# Patient Record
Sex: MALE | Race: WHITE
[De-identification: names, ages, dates, MRNs, and addresses within clinical notes are randomized per-mention and may not be internally consistent; named-entity substitution may affect disease eponyms.]

---

## 2021-04-19 ENCOUNTER — HOSPITAL ENCOUNTER (EMERGENCY)
Dept: HOSPITAL 56 - MW.ED | Age: 56
Discharge: HOME | End: 2021-04-19
Payer: SELF-PAY

## 2021-04-19 DIAGNOSIS — L03.221: Primary | ICD-10-CM

## 2021-04-19 LAB
BUN SERPL-MCNC: 19 MG/DL (ref 7–18)
CHLORIDE SERPL-SCNC: 101 MMOL/L (ref 98–107)
CO2 SERPL-SCNC: 26.8 MMOL/L (ref 21–32)
GLUCOSE SERPL-MCNC: 97 MG/DL (ref 74–106)
POTASSIUM SERPL-SCNC: 4.3 MMOL/L (ref 3.5–5.1)
SODIUM SERPL-SCNC: 139 MMOL/L (ref 136–148)

## 2021-04-19 PROCEDURE — 80053 COMPREHEN METABOLIC PANEL: CPT

## 2021-04-19 PROCEDURE — 85025 COMPLETE CBC W/AUTO DIFF WBC: CPT

## 2021-04-19 PROCEDURE — 70491 CT SOFT TISSUE NECK W/DYE: CPT

## 2021-04-19 PROCEDURE — 36415 COLL VENOUS BLD VENIPUNCTURE: CPT

## 2021-04-19 PROCEDURE — 99284 EMERGENCY DEPT VISIT MOD MDM: CPT

## 2021-04-19 NOTE — CT
INDICATION:



Pain and swelling within the posterior neck.



COMPARISON:



none



TECHNIQUE:



A CT volumetric acquisition was performed of the neck during intravenous 

infusion of 80 cc of Isovue 370. 



FINDINGS:



The CT images demonstrate subcutaneous edema within the right upper neck 

and posterior skull region. There is also mild swelling within the splenius 

capitis muscle which contains a small 12 x 5 mm focus of decreased density 

and marginal enhancement. This could reflect a small intramuscular abscess 

and is identified on images [id number]. There are few small posterior cervical 

lymph nodes. 



Normal aeration of the mastoid air cells and middle ear cavities. The 

paranasal sinuses are clear. The nasopharynx appears normal. The parotid 

and submandibular glands are of normal size and have uniform enhancement. 

The oropharynx appears normal. The valleculae, epiglottis, aryepiglottic 

folds and piriform sinuses appear normal. There is a normal appearance of 

the larynx and subglottic trachea. The thyroid gland is of normal size and 

has uniform density. There is no evidence of lymphadenopathy within the 

anterior and posterior cervical triangles or within the supraclavicular 

region.



IMPRESSION:



Probable small intramuscular abscess within the right splenius capitis 

muscle with associated muscular edema and edema within the overlying 

subcutaneous tissues.



Please note that all CT scans at this facility use dose modulation, 

iterative reconstruction, and/or weight-based dosing when appropriate to 

reduce radiation dose to as low as reasonably achievable.



Dictated by Tim Elizabeth MD @ Apr 19 2021  6:43PM



Signed by Dr. Tim Elizabeth @ Apr 19 2021  6:53PM